# Patient Record
Sex: FEMALE
[De-identification: names, ages, dates, MRNs, and addresses within clinical notes are randomized per-mention and may not be internally consistent; named-entity substitution may affect disease eponyms.]

---

## 2020-05-06 PROBLEM — Z00.00 ENCOUNTER FOR PREVENTIVE HEALTH EXAMINATION: Status: ACTIVE | Noted: 2020-05-06

## 2020-05-13 ENCOUNTER — APPOINTMENT (OUTPATIENT)
Dept: OTOLARYNGOLOGY | Facility: CLINIC | Age: 65
End: 2020-05-13
Payer: MEDICARE

## 2020-05-13 VITALS
TEMPERATURE: 97.9 F | WEIGHT: 156 LBS | BODY MASS INDEX: 25.99 KG/M2 | HEIGHT: 65 IN | DIASTOLIC BLOOD PRESSURE: 70 MMHG | SYSTOLIC BLOOD PRESSURE: 129 MMHG

## 2020-05-13 DIAGNOSIS — J32.0 CHRONIC MAXILLARY SINUSITIS: ICD-10-CM

## 2020-05-13 DIAGNOSIS — Z85.3 PERSONAL HISTORY OF MALIGNANT NEOPLASM OF BREAST: ICD-10-CM

## 2020-05-13 DIAGNOSIS — Z83.3 FAMILY HISTORY OF DIABETES MELLITUS: ICD-10-CM

## 2020-05-13 DIAGNOSIS — Z87.2 PERSONAL HISTORY OF DISEASES OF THE SKIN AND SUBCUTANEOUS TISSUE: ICD-10-CM

## 2020-05-13 DIAGNOSIS — Z87.891 PERSONAL HISTORY OF NICOTINE DEPENDENCE: ICD-10-CM

## 2020-05-13 DIAGNOSIS — D49.7 NEOPLASM OF UNSPECIFIED BEHAVIOR OF ENDOCRINE GLANDS AND OTHER PARTS OF NERVOUS SYSTEM: ICD-10-CM

## 2020-05-13 DIAGNOSIS — Z82.49 FAMILY HISTORY OF ISCHEMIC HEART DISEASE AND OTHER DISEASES OF THE CIRCULATORY SYSTEM: ICD-10-CM

## 2020-05-13 DIAGNOSIS — Z80.9 FAMILY HISTORY OF MALIGNANT NEOPLASM, UNSPECIFIED: ICD-10-CM

## 2020-05-13 PROCEDURE — 31231 NASAL ENDOSCOPY DX: CPT

## 2020-05-13 PROCEDURE — 99203 OFFICE O/P NEW LOW 30 MIN: CPT | Mod: 25

## 2020-05-13 RX ORDER — FLUTICASONE PROPIONATE 50 UG/1
50 SPRAY, METERED NASAL DAILY
Qty: 3 | Refills: 1 | Status: ACTIVE | COMMUNITY
Start: 2020-05-13 | End: 1900-01-01

## 2020-06-17 PROBLEM — D49.7 THYROID NEOPLASM: Status: ACTIVE | Noted: 2020-06-17

## 2020-06-17 PROBLEM — Z87.2 HISTORY OF PSORIASIS: Status: RESOLVED | Noted: 2020-06-17 | Resolved: 2020-06-17

## 2020-06-17 NOTE — PROCEDURE
[FreeTextEntry6] : \par Indication:  chronic sinusitis\par -Verbal consent was obtained from patient prior to exam. \par Nasal endoscopy was performed with flexible scope.\par Findings: \par -- Inferior turbinates normal  bilateral.  Inferior meatus clear bilateral.\par -- Septum was mildly deviated to the left .\par -- No polyps either side nose\par -- Mucus clear bilateral\par -- Middle turb paradoxical on right; normal on left.  Superior turbinates normal bilateral\par -- Middle meatus mildly congested on right.  Left MM clear  SER clear bilateral.\par -- Nasopharynx without mass or exudate\par -- Adenoids were absent  \par -- Eustachian orifices were clear bilateral\par \par The patient tolerated the procedure well.\par \par

## 2020-06-17 NOTE — ASSESSMENT
[FreeTextEntry1] : Ms. 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 Soto.\par \par PLAN:\par Fluticasone nasal spray daily to help reduce edema in right middle meatus, which could help aerate the sinus more.\par Reassessment of the sinusitis after thyroid recovery.\par \par

## 2020-06-17 NOTE — REVIEW OF SYSTEMS
[Patient Intake Form Reviewed] : Patient intake form was reviewed [Feeling Tired] : feeling tired [As Noted in HPI] : as noted in HPI [Recent Weight Gain (___ Lbs)] : recent [unfilled] ~Ulb weight gain [Itching] : itching [Recent Weight Loss (___ Lbs)] : recent [unfilled] ~Ulb weight loss [Negative] : Heme/Lymph [FreeTextEntry2] : daytime sleepiness [de-identified] : rash, hives

## 2020-06-17 NOTE — HISTORY OF PRESENT ILLNESS
[de-identified] : Ms. Beasley is a 64 year old woman who was referred by Dr. 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  She is to have thyroid surgery by Dr. Soto.\par No nasal congestion, postnasal drip, rhinorrhea or facial pain.\par No recurrent sinus infections.\par \par To have thyroid surgery sometime in next few months.\par No neck pressure, difficulty breathing, difficulty swallowing or voice change.\par No FH of thyroid cancer\par No radiation exposure except routine imaging.\par \par [place] [facility] [place] - Comparison:  None\par - RIGHT maxillary sinus is majority filled with high attenuation partially organized material, with thickening of sinus wall suggesting a chronic inflammatory process; does not seem periodontal in nature.  Segmental defect in posterior wall w/out evidence for extra-sinus soft tissue.  Hard palate intact.  Other sinuses clear.  Brain unremarkable.\par - Thyroid gland with multinodular pattern, with enlargement of left lobe by 3.3 x 2.8 x 3.8 cm long nodule; short contact with esophagus and prevertebral plane which appear preserved.\par (Images were reviewed on CD, returned to pt.) \par

## 2020-06-17 NOTE — CONSULT LETTER
[Dear  ___] : Dear  [unfilled], [( Thank you for referring [unfilled] for consultation for _____ )] : Thank you for referring [unfilled] for consultation for [unfilled] [Consult Closing:] : Thank you very much for allowing me to participate in the care of this patient.  If you have any questions, please do not hesitate to contact me. [Sincerely,] : Sincerely, [Please see my note below.] : Please see my note below. [FreeTextEntry2] : Melanie Morrison MD\par [address]\[place] [address] [FreeTextEntry3] : \par Zakia Walker MD \par Otolaryngology, Head and Neck Surgery \par \par \par